# Patient Record
Sex: MALE | Race: BLACK OR AFRICAN AMERICAN | NOT HISPANIC OR LATINO | URBAN - METROPOLITAN AREA
[De-identification: names, ages, dates, MRNs, and addresses within clinical notes are randomized per-mention and may not be internally consistent; named-entity substitution may affect disease eponyms.]

---

## 2021-11-09 ENCOUNTER — WEB ENCOUNTER (OUTPATIENT)
Dept: URBAN - METROPOLITAN AREA CLINIC 25 | Facility: CLINIC | Age: 51
End: 2021-11-09

## 2021-11-09 ENCOUNTER — OFFICE VISIT (OUTPATIENT)
Dept: URBAN - METROPOLITAN AREA CLINIC 25 | Facility: CLINIC | Age: 51
End: 2021-11-09
Payer: COMMERCIAL

## 2021-11-09 VITALS
HEART RATE: 75 BPM | SYSTOLIC BLOOD PRESSURE: 132 MMHG | DIASTOLIC BLOOD PRESSURE: 62 MMHG | WEIGHT: 190 LBS | HEIGHT: 69 IN | BODY MASS INDEX: 28.14 KG/M2 | TEMPERATURE: 97.1 F

## 2021-11-09 DIAGNOSIS — K60.2 ANAL FISSURE: ICD-10-CM

## 2021-11-09 DIAGNOSIS — K64.8 INTERNAL HEMORRHOID: ICD-10-CM

## 2021-11-09 DIAGNOSIS — K92.1 HEMATOCHEZIA: ICD-10-CM

## 2021-11-09 PROCEDURE — 99204 OFFICE O/P NEW MOD 45 MIN: CPT | Performed by: INTERNAL MEDICINE

## 2021-11-09 NOTE — HPI-TODAY'S VISIT:
51 year old man presenting with complaint of hemorrhoids.  Overall he feels well.  He denies anroexia or weight loss.  He denies abdominal pain.  He denies UGI symptoms.  He denies dyspahgia.  He has 2 BM's a day. He denies strain and empties well.  He has had some rectal itch and some blood in the stool.  He denies rectal pain.  He has had BRB on the tissue and in the stool.  This has been present for about 2.5 months.  It is intermittent.  He ahs not tried Prep H.  We idd hemorrhoid banding about 10 years ago.  His last colonoscopy was in 9/2015 with a 10 year recall

## 2021-11-09 NOTE — PHYSICAL EXAM GASTROINTESTINAL
Abdomen , soft, nontender, nondistended , no guarding or rigidity , no masses palpable , normal bowel sounds , Liver and Spleen , no hepatomegaly present , no hepatosplenomegaly , liver nontender , spleen not palpable , Rectal  Increased sphincter tone , Grade 1internal hemorrhoids, No rectal masses or bleeding present.  Anal fissure , Abdomen , soft, nontender, nondistended , no guarding or rigidity , no masses palpable , normal bowel sounds , Liver and Spleen , no hepatomegaly present , no hepatosplenomegaly , liver nontender , spleen not palpable

## 2021-12-15 ENCOUNTER — TELEPHONE ENCOUNTER (OUTPATIENT)
Dept: URBAN - METROPOLITAN AREA CLINIC 92 | Facility: CLINIC | Age: 51
End: 2021-12-15

## 2021-12-15 ENCOUNTER — OFFICE VISIT (OUTPATIENT)
Dept: URBAN - METROPOLITAN AREA SURGERY CENTER 20 | Facility: SURGERY CENTER | Age: 51
End: 2021-12-15
Payer: COMMERCIAL

## 2021-12-15 DIAGNOSIS — K92.1 ACUTE MELENA: ICD-10-CM

## 2021-12-15 DIAGNOSIS — K64.0 BLEEDING GRADE I HEMORRHOIDS: ICD-10-CM

## 2021-12-15 DIAGNOSIS — K60.2 ANAL FISSURE: ICD-10-CM

## 2021-12-15 PROCEDURE — G8907 PT DOC NO EVENTS ON DISCHARG: HCPCS | Performed by: INTERNAL MEDICINE

## 2021-12-15 PROCEDURE — 45350 SGMDSC W/BAND LIGATION: CPT | Performed by: INTERNAL MEDICINE

## 2022-02-01 ENCOUNTER — OFFICE VISIT (OUTPATIENT)
Dept: URBAN - METROPOLITAN AREA CLINIC 25 | Facility: CLINIC | Age: 52
End: 2022-02-01
Payer: COMMERCIAL

## 2022-02-01 VITALS
BODY MASS INDEX: 29.18 KG/M2 | HEART RATE: 90 BPM | DIASTOLIC BLOOD PRESSURE: 95 MMHG | TEMPERATURE: 97.5 F | SYSTOLIC BLOOD PRESSURE: 146 MMHG | WEIGHT: 197 LBS | HEIGHT: 69 IN

## 2022-02-01 DIAGNOSIS — K64.0 GRADE I HEMORRHOIDS: ICD-10-CM

## 2022-02-01 DIAGNOSIS — K60.2 ANAL FISSURE: ICD-10-CM

## 2022-02-01 PROCEDURE — 46221 LIGATION OF HEMORRHOID(S): CPT | Performed by: INTERNAL MEDICINE

## 2022-02-01 PROCEDURE — 99212 OFFICE O/P EST SF 10 MIN: CPT | Performed by: INTERNAL MEDICINE

## 2022-02-01 NOTE — HPI-TODAY'S VISIT:
Flex Sig on 12/15 was normal except for anal fissure and hemorrhodis.  I banded the RP column of hemorrhoids.  He did well after the banding.  He is using the Nifedipine BID>  He is moving his bowels well.  He denies rectal pain.  He is still having rectal bleed with every BM.  This is blood on the tissue.  He denies abdominal pain.  He denies anorexia or weight loss.  He denies UGI symptoms

## 2022-02-01 NOTE — PHYSICAL EXAM GASTROINTESTINAL
Abdomen , soft, nontender, nondistended , no guarding or rigidity , no masses palpable , normal bowel sounds , Liver and Spleen , no hepatomegaly present , no hepatosplenomegaly , liver nontender , spleen not palpable , Rectal , normal sphincter tone , no internal hemorrhoids, rectal masses or bleeding present.  banded LL column

## 2022-04-06 ENCOUNTER — WEB ENCOUNTER (OUTPATIENT)
Dept: URBAN - METROPOLITAN AREA CLINIC 25 | Facility: CLINIC | Age: 52
End: 2022-04-06

## 2022-04-06 ENCOUNTER — OFFICE VISIT (OUTPATIENT)
Dept: URBAN - METROPOLITAN AREA CLINIC 25 | Facility: CLINIC | Age: 52
End: 2022-04-06
Payer: COMMERCIAL

## 2022-04-06 DIAGNOSIS — K64.0 GRADE I HEMORRHOIDS: ICD-10-CM

## 2022-04-06 PROCEDURE — 46221 LIGATION OF HEMORRHOID(S): CPT | Performed by: INTERNAL MEDICINE

## 2022-04-06 PROCEDURE — 99213 OFFICE O/P EST LOW 20 MIN: CPT | Performed by: INTERNAL MEDICINE

## 2022-04-06 NOTE — PHYSICAL EXAM GASTROINTESTINAL
Abdomen , soft, nontender, nondistended , no guarding or rigidity , no masses palpable , normal bowel sounds , Liver and Spleen , no hepatomegaly present , no hepatosplenomegaly , liver nontender , spleen not palpable , Rectal , normal sphincter tone , Grade 1 internal hemorrhoids, No rectal masses or bleeding present.  banded RA column

## 2022-04-06 NOTE — HPI-TODAY'S VISIT:
Patient last seen about 2 months ago.  We did a second session of banding at that time.  He had no comlications from the banding.  He does have a small amont of blood on the tissue.  He feels like the bleeding has decreased a lot.  He denies rectal pain.  He denies LGI symptoms.  He denies abdominal pain.  He denies anorexia or weight loss.  He denies UGI symptoms

## 2022-04-11 PROBLEM — 30037006 ANAL FISSURE: Status: ACTIVE | Noted: 2021-11-11

## 2022-09-06 ENCOUNTER — DASHBOARD ENCOUNTERS (OUTPATIENT)
Age: 52
End: 2022-09-06

## 2022-09-06 ENCOUNTER — OFFICE VISIT (OUTPATIENT)
Dept: URBAN - METROPOLITAN AREA CLINIC 25 | Facility: CLINIC | Age: 52
End: 2022-09-06
Payer: COMMERCIAL

## 2022-09-06 VITALS
BODY MASS INDEX: 28.73 KG/M2 | HEIGHT: 69 IN | TEMPERATURE: 97.9 F | WEIGHT: 194 LBS | SYSTOLIC BLOOD PRESSURE: 139 MMHG | DIASTOLIC BLOOD PRESSURE: 85 MMHG | HEART RATE: 71 BPM

## 2022-09-06 DIAGNOSIS — K92.1 HEMATOCHEZIA: ICD-10-CM

## 2022-09-06 DIAGNOSIS — K64.8 INTERNAL HEMORRHOID: ICD-10-CM

## 2022-09-06 PROBLEM — 405729008 HEMATOCHEZIA: Status: ACTIVE | Noted: 2021-11-11

## 2022-09-06 PROBLEM — 90458007 INTERNAL HEMORRHOID: Status: ACTIVE | Noted: 2021-11-11

## 2022-09-06 PROCEDURE — 99213 OFFICE O/P EST LOW 20 MIN: CPT | Performed by: INTERNAL MEDICINE

## 2022-09-06 NOTE — HPI-TODAY'S VISIT:
Patient underwent 3rd session of banding in 4/2022.  Over the past month he has had some recurrent rectal bleeding.  This is BRB on the tissue and in the toilet.  His stool is brown.  He denies rectal pain.  He has occasional rectal itch.  He has regular BM's.  He denies strain and he empties well.  He denies abdominal pain.  He denies anorexia or weight loss.  He denies UGI symptoms.

## 2022-09-22 ENCOUNTER — APPOINTMENT (OUTPATIENT)
Dept: URBAN - METROPOLITAN AREA CLINIC 206 | Age: 52
Setting detail: DERMATOLOGY
End: 2022-09-29

## 2022-09-22 DIAGNOSIS — L30.8 OTHER SPECIFIED DERMATITIS: ICD-10-CM

## 2022-09-22 DIAGNOSIS — L21.8 OTHER SEBORRHEIC DERMATITIS: ICD-10-CM

## 2022-09-22 PROCEDURE — OTHER COUNSELING: OTHER

## 2022-09-22 PROCEDURE — OTHER PRESCRIPTION: OTHER

## 2022-09-22 PROCEDURE — OTHER PRESCRIPTION MEDICATION MANAGEMENT: OTHER

## 2022-09-22 PROCEDURE — 99204 OFFICE O/P NEW MOD 45 MIN: CPT

## 2022-09-22 RX ORDER — KETOCONAZOLE 20 MG/ML
SHAMPOO, SUSPENSION TOPICAL
Qty: 120 | Refills: 8 | Status: ERX | COMMUNITY
Start: 2022-09-22

## 2022-09-22 RX ORDER — FLUOCINONIDE 0.5 MG/G
OINTMENT TOPICAL BID
Qty: 30 | Refills: 1 | Status: ERX | COMMUNITY
Start: 2022-09-22

## 2022-09-22 ASSESSMENT — LOCATION DETAILED DESCRIPTION DERM
LOCATION DETAILED: RIGHT SUPERIOR OCCIPITAL SCALP
LOCATION DETAILED: RIGHT MID DORSAL RING FINGER

## 2022-09-22 ASSESSMENT — LOCATION SIMPLE DESCRIPTION DERM
LOCATION SIMPLE: RIGHT OCCIPITAL SCALP
LOCATION SIMPLE: RIGHT RING FINGER

## 2022-09-22 ASSESSMENT — LOCATION ZONE DERM
LOCATION ZONE: FINGER
LOCATION ZONE: SCALP

## 2022-09-22 NOTE — PROCEDURE: PRESCRIPTION MEDICATION MANAGEMENT
Render In Strict Bullet Format?: No
Initiate Treatment: fluocinonide 0.05 % topical ointment Bid\\nQuantity: 30.0 g  Days Supply: 30\\nSig: Apply to affected area of hand bid x 2 weeks/month, as needed
Detail Level: Zone
Plan: Soak 1/4 cup of white vinegar to 1 cup of water soak 5-10 mins then apply ointment apply vinyl glove or Saran Wrap x 3 days then stop and only apply ointment as directed ( for 2 weeks)
Initiate Treatment: ketoconazole 2 % shampoo \\nQuantity: 120.0 ml  Days Supply: 30\\nSig: Lather on scalp and let sit for 3-5 mins, then rinse twice a week as maintenance

## 2022-09-22 NOTE — HPI: DRY SKIN
How Severe Is Your Dry Skin?: moderate
Is This A New Presentation Or A Follow-Up?: Dry Skin
How Many Showers Or Baths Do You Take In One Day?: 2
